# Patient Record
Sex: FEMALE | Race: WHITE | NOT HISPANIC OR LATINO | Employment: OTHER | URBAN - METROPOLITAN AREA
[De-identification: names, ages, dates, MRNs, and addresses within clinical notes are randomized per-mention and may not be internally consistent; named-entity substitution may affect disease eponyms.]

---

## 2023-10-24 ENCOUNTER — OFFICE VISIT (OUTPATIENT)
Dept: ORTHOPEDIC SURGERY | Facility: CLINIC | Age: 78
End: 2023-10-24
Payer: MEDICARE

## 2023-10-24 VITALS — BODY MASS INDEX: 48.82 KG/M2 | WEIGHT: 293 LBS | HEIGHT: 65 IN

## 2023-10-24 DIAGNOSIS — M21.41 ACQUIRED FLAT FOOT, RIGHT: ICD-10-CM

## 2023-10-24 DIAGNOSIS — Z96.653 STATUS POST BILATERAL KNEE REPLACEMENTS: Primary | ICD-10-CM

## 2023-10-24 DIAGNOSIS — Z96.643 STATUS POST BILATERAL TOTAL HIP REPLACEMENT: ICD-10-CM

## 2023-10-24 PROCEDURE — 1159F MED LIST DOCD IN RCRD: CPT | Performed by: ORTHOPAEDIC SURGERY

## 2023-10-24 PROCEDURE — 1126F AMNT PAIN NOTED NONE PRSNT: CPT | Performed by: ORTHOPAEDIC SURGERY

## 2023-10-24 PROCEDURE — 99213 OFFICE O/P EST LOW 20 MIN: CPT | Performed by: ORTHOPAEDIC SURGERY

## 2023-10-24 PROCEDURE — 1160F RVW MEDS BY RX/DR IN RCRD: CPT | Performed by: ORTHOPAEDIC SURGERY

## 2023-10-24 ASSESSMENT — PAIN - FUNCTIONAL ASSESSMENT: PAIN_FUNCTIONAL_ASSESSMENT: NO/DENIES PAIN

## 2023-10-24 ASSESSMENT — PAIN SCALES - GENERAL: PAINLEVEL_OUTOF10: 0 - NO PAIN

## 2023-10-24 NOTE — PROGRESS NOTES
Subjective    Patient ID: Reema Avila is a 78 y.o. female.    Chief Complaint: Follow-up of the Left Hip (S/P EDNA), Follow-up of the Left Knee (S/P TKA), Follow-up of the Right Hip (S/P EDNA), and Follow-up of the Right Knee (S/P TKA)     Last Surgery: No surgery found  Last Surgery Date: No surgery found    HPI both hips and both knees are doing terrific    Objective   Ortho Exam full range of motion hips without pain knees show range of motion 0-1 10 soft tissue stop excellent stability no pain no effusion right foot shows a collapsed arch    Image Results:      Assessment/Plan she is doing terrific at the hips and knees close we will see her again in about a year she is getting some conflicting information about the right foot so I asked her to go see Dr. Castillo  Encounter Diagnoses:  Status post bilateral knee replacements    Status post bilateral total hip replacement    Acquired flat foot, right    No orders of the defined types were placed in this encounter.    No follow-ups on file.

## 2023-11-09 ENCOUNTER — TELEPHONE (OUTPATIENT)
Dept: OBSTETRICS AND GYNECOLOGY | Facility: CLINIC | Age: 78
End: 2023-11-09
Payer: MEDICARE

## 2023-11-09 DIAGNOSIS — Z12.31 VISIT FOR SCREENING MAMMOGRAM: Primary | ICD-10-CM

## 2023-11-10 ENCOUNTER — TELEPHONE (OUTPATIENT)
Dept: OBSTETRICS AND GYNECOLOGY | Facility: HOSPITAL | Age: 78
End: 2023-11-10
Payer: MEDICARE

## 2023-11-10 DIAGNOSIS — Z12.31 VISIT FOR SCREENING MAMMOGRAM: Primary | ICD-10-CM

## 2023-12-12 ENCOUNTER — HOSPITAL ENCOUNTER (OUTPATIENT)
Dept: RADIOLOGY | Facility: HOSPITAL | Age: 78
Discharge: HOME | End: 2023-12-12
Payer: MEDICARE

## 2023-12-12 ENCOUNTER — APPOINTMENT (OUTPATIENT)
Dept: OBSTETRICS AND GYNECOLOGY | Facility: CLINIC | Age: 78
End: 2023-12-12
Payer: MEDICARE

## 2023-12-12 VITALS — HEIGHT: 65 IN | WEIGHT: 293 LBS | BODY MASS INDEX: 48.82 KG/M2

## 2023-12-12 DIAGNOSIS — Z12.31 VISIT FOR SCREENING MAMMOGRAM: ICD-10-CM

## 2023-12-12 PROCEDURE — 77063 BREAST TOMOSYNTHESIS BI: CPT

## 2023-12-13 DIAGNOSIS — R92.8 ABNORMAL MAMMOGRAM: Primary | ICD-10-CM

## 2023-12-14 ENCOUNTER — APPOINTMENT (OUTPATIENT)
Dept: RADIOLOGY | Facility: HOSPITAL | Age: 78
End: 2023-12-14
Payer: MEDICARE

## 2023-12-19 ENCOUNTER — APPOINTMENT (OUTPATIENT)
Dept: RADIOLOGY | Facility: HOSPITAL | Age: 78
End: 2023-12-19
Payer: MEDICARE

## 2024-01-04 ENCOUNTER — HOSPITAL ENCOUNTER (OUTPATIENT)
Dept: RADIOLOGY | Facility: HOSPITAL | Age: 79
Discharge: HOME | End: 2024-01-04
Payer: MEDICARE

## 2024-01-04 DIAGNOSIS — R92.8 ABNORMAL MAMMOGRAM: ICD-10-CM

## 2024-01-04 DIAGNOSIS — R92.8 OTHER ABNORMAL AND INCONCLUSIVE FINDINGS ON DIAGNOSTIC IMAGING OF BREAST: ICD-10-CM

## 2024-01-04 PROCEDURE — 76642 ULTRASOUND BREAST LIMITED: CPT | Mod: LT

## 2024-01-04 PROCEDURE — 77061 BREAST TOMOSYNTHESIS UNI: CPT | Mod: LT

## 2024-01-04 PROCEDURE — 76982 USE 1ST TARGET LESION: CPT | Mod: LT

## 2024-01-30 ENCOUNTER — OFFICE VISIT (OUTPATIENT)
Dept: OBSTETRICS AND GYNECOLOGY | Facility: CLINIC | Age: 79
End: 2024-01-30
Payer: MEDICARE

## 2024-01-30 VITALS
WEIGHT: 293 LBS | BODY MASS INDEX: 50.02 KG/M2 | HEIGHT: 64 IN | SYSTOLIC BLOOD PRESSURE: 128 MMHG | DIASTOLIC BLOOD PRESSURE: 68 MMHG

## 2024-01-30 DIAGNOSIS — Z01.419 VISIT FOR PELVIC EXAM: Primary | ICD-10-CM

## 2024-01-30 DIAGNOSIS — R92.8 ABNORMAL MAMMOGRAM: ICD-10-CM

## 2024-01-30 DIAGNOSIS — Z12.31 VISIT FOR SCREENING MAMMOGRAM: ICD-10-CM

## 2024-01-30 PROCEDURE — G0101 CA SCREEN;PELVIC/BREAST EXAM: HCPCS | Performed by: OBSTETRICS & GYNECOLOGY

## 2024-01-30 PROCEDURE — 1036F TOBACCO NON-USER: CPT | Performed by: OBSTETRICS & GYNECOLOGY

## 2024-01-30 PROCEDURE — 1126F AMNT PAIN NOTED NONE PRSNT: CPT | Performed by: OBSTETRICS & GYNECOLOGY

## 2024-01-30 PROCEDURE — 1159F MED LIST DOCD IN RCRD: CPT | Performed by: OBSTETRICS & GYNECOLOGY

## 2024-01-30 RX ORDER — LISINOPRIL AND HYDROCHLOROTHIAZIDE 20; 25 MG/1; MG/1
TABLET ORAL
COMMUNITY
Start: 2023-02-28

## 2024-01-30 RX ORDER — MULTIVITAMIN
1 TABLET ORAL
COMMUNITY
Start: 2012-09-06

## 2024-01-30 RX ORDER — DULOXETIN HYDROCHLORIDE 60 MG/1
60 CAPSULE, DELAYED RELEASE ORAL
COMMUNITY
Start: 2013-07-29 | End: 2024-03-31

## 2024-01-30 RX ORDER — METOPROLOL TARTRATE 100 MG/1
100 TABLET ORAL 2 TIMES DAILY
COMMUNITY
Start: 2012-09-06

## 2024-01-30 RX ORDER — LEVOTHYROXINE SODIUM 112 UG/1
112 TABLET ORAL
COMMUNITY
Start: 2023-09-25

## 2024-01-30 RX ORDER — PROPRANOLOL/HYDROCHLOROTHIAZID 40 MG-25MG
TABLET ORAL
COMMUNITY

## 2024-01-30 RX ORDER — ASCORBIC ACID 500 MG
TABLET ORAL
COMMUNITY

## 2024-01-30 RX ORDER — AMLODIPINE BESYLATE 10 MG/1
10 TABLET ORAL
COMMUNITY
Start: 2023-09-25

## 2024-01-30 RX ORDER — LOVASTATIN 40 MG/1
40 TABLET ORAL NIGHTLY
COMMUNITY
Start: 2012-09-04

## 2024-01-30 ASSESSMENT — ENCOUNTER SYMPTOMS
GASTROINTESTINAL NEGATIVE: 1
PSYCHIATRIC NEGATIVE: 1
CARDIOVASCULAR NEGATIVE: 1
RESPIRATORY NEGATIVE: 1
MUSCULOSKELETAL NEGATIVE: 1
CONSTITUTIONAL NEGATIVE: 1
NEUROLOGICAL NEGATIVE: 1

## 2024-01-30 NOTE — PROGRESS NOTES
"Subjective   Reema Avila is a 78 y.o. female who presents for annual exam. The patient has no complaints today. The patient is not sexually active. GYN screening history: last pap: was normal. The patient is not taking hormone replacement therapy. Patient denies post-menopausal vaginal bleeding.. The patient wears seatbelts: yes. The patient participates in regular exercise: no. Has the patient ever been transfused or tattooed?: no. The patient reports that there is not domestic violence in their life.     Menstrual History:  OB History          5    Para   3    Term   3            AB   2    Living   3         SAB   2    IAB        Ectopic        Multiple        Live Births   3                  No LMP recorded. Patient is postmenopausal.         Review of Systems   Constitutional: Negative.    Respiratory: Negative.     Cardiovascular: Negative.    Gastrointestinal: Negative.    Genitourinary: Negative.    Musculoskeletal: Negative.    Neurological: Negative.    Psychiatric/Behavioral: Negative.          Objective   /68   Ht 1.626 m (5' 4\")   Wt 136 kg (300 lb)   BMI 51.49 kg/m²     General:   alert and oriented, in no acute distress   Heart: regular rate and rhythm, S1, S2 normal, no murmur, click, rub or gallop   Lungs: clear to auscultation bilaterally   Abdomen: soft, non-tender, without masses or organomegaly   Pelvic: Vulva normal in appearance without discoloration, rashes, lesions. Vagina is negative for blood, discharge, lesions. Uterus is small, mobile, non tender. There is no cervical motion tenderness, adnexal masses/tenderness.    Breast: No masses, skin changes, discoloration, tenderness, or nipple drainage bilaterally     Lymph: No LAD   Neck: Normal ROM. Thyroid normal size. No masses/tenderness     Psych: Normal mood/affect     Lab Review      Assessment/Plan   Problem List Items Addressed This Visit    None  Visit Diagnoses         Codes    Visit for pelvic exam    " -  Primary Z01.419    Breast and pelvic exam normal  Precautions given  RTO 2 years     Visit for screening mammogram     Z12.31    Order given 1/30/24

## 2024-01-30 NOTE — PATIENT INSTRUCTIONS
Routine Gynecologic Visit:  You were seen today for a routine gyn visit with normal findings on exam  Your pap smear had normal cells and was negative for HPV prior to age 65, so you were not due for a pap smear today. You should still continue to get annual breast and pelvic exams in the office.  An order was placed in the system for mammogram. Please get done at your earliest convenience  If you are having any gynecologic issues in the next year you should call the office. [phone] ([place]) or [phone] ([place])

## 2024-02-27 ENCOUNTER — OFFICE VISIT (OUTPATIENT)
Dept: CARDIOLOGY | Facility: CLINIC | Age: 79
End: 2024-02-27
Payer: MEDICARE

## 2024-02-27 VITALS
BODY MASS INDEX: 50.02 KG/M2 | RESPIRATION RATE: 16 BRPM | WEIGHT: 293 LBS | OXYGEN SATURATION: 93 % | DIASTOLIC BLOOD PRESSURE: 91 MMHG | HEIGHT: 64 IN | SYSTOLIC BLOOD PRESSURE: 165 MMHG | HEART RATE: 67 BPM

## 2024-02-27 DIAGNOSIS — I10 ESSENTIAL HYPERTENSION: Primary | ICD-10-CM

## 2024-02-27 DIAGNOSIS — E78.5 DYSLIPIDEMIA: ICD-10-CM

## 2024-02-27 DIAGNOSIS — I35.0 NONRHEUMATIC AORTIC VALVE STENOSIS: ICD-10-CM

## 2024-02-27 PROCEDURE — 99214 OFFICE O/P EST MOD 30 MIN: CPT | Performed by: INTERNAL MEDICINE

## 2024-02-27 PROCEDURE — 3077F SYST BP >= 140 MM HG: CPT | Performed by: INTERNAL MEDICINE

## 2024-02-27 PROCEDURE — 3080F DIAST BP >= 90 MM HG: CPT | Performed by: INTERNAL MEDICINE

## 2024-02-27 PROCEDURE — 93005 ELECTROCARDIOGRAM TRACING: CPT | Performed by: INTERNAL MEDICINE

## 2024-02-27 PROCEDURE — 1159F MED LIST DOCD IN RCRD: CPT | Performed by: INTERNAL MEDICINE

## 2024-02-27 PROCEDURE — 93010 ELECTROCARDIOGRAM REPORT: CPT | Performed by: INTERNAL MEDICINE

## 2024-02-27 RX ORDER — LOSARTAN POTASSIUM AND HYDROCHLOROTHIAZIDE 25; 100 MG/1; MG/1
1 TABLET ORAL DAILY
COMMUNITY
Start: 2024-02-02 | End: 2024-05-02

## 2024-02-27 ASSESSMENT — PATIENT HEALTH QUESTIONNAIRE - PHQ9
1. LITTLE INTEREST OR PLEASURE IN DOING THINGS: NOT AT ALL
2. FEELING DOWN, DEPRESSED OR HOPELESS: NOT AT ALL
SUM OF ALL RESPONSES TO PHQ9 QUESTIONS 1 AND 2: 0

## 2024-02-27 ASSESSMENT — ENCOUNTER SYMPTOMS: DEPRESSION: 0

## 2024-02-27 NOTE — PROGRESS NOTES
"Chief Complaint:   Follow-up (Annual FUV)     History Of Present Illness:    Reema Avila is a 78 y.o. female presenting for follow-up.  Doing well from a cardiac standpoint at this time--denies any chest pain, shortness of breath, dizziness/syncope.  Compliant medications.     Last Recorded Vitals:  Vitals:    02/27/24 0853   BP: (!) 165/91   BP Location: Left arm   Patient Position: Sitting   BP Cuff Size: Large adult   Pulse: 67   Resp: 16   SpO2: 93%   Weight: 135 kg (298 lb)   Height: 1.626 m (5' 4\")       Past Medical History:  She has a past medical history of Encounter for full-term uncomplicated delivery, Encounter for gynecological examination (general) (routine) without abnormal findings, Personal history of other diseases of the circulatory system, Personal history of other diseases of the musculoskeletal system and connective tissue (06/13/2018), Personal history of other endocrine, nutritional and metabolic disease, and Varicella without complication.    Past Surgical History:  She has a past surgical history that includes Colonoscopy (05/06/2013); Colonoscopy (04/21/2016); Other surgical history (10/31/2018); Other surgical history (10/31/2018); Dilation and curettage of uterus (10/31/2018); Other surgical history (03/24/2021); Total knee arthroplasty (10/31/2018); Total knee arthroplasty (10/31/2018); BI stereotactic guided breast left localization and biopsy (Left, 03/30/2014); and Breast biopsy (Left, 03/30/2014).      Social History:  She reports that she has never smoked. She has never used smokeless tobacco. She reports that she does not currently use alcohol. She reports that she does not use drugs.    Family History:  Family History   Problem Relation Name Age of Onset    Ovarian cancer Maternal Grandmother  46        Allergies:  Epinephrine    Outpatient Medications:  Current Outpatient Medications   Medication Instructions    amLODIPine (NORVASC) 10 mg, oral, Daily RT    ascorbic " acid (Vitamin C) 500 mg tablet oral    CALCIUM CITRATE-VITAMIN D3 ORAL oral    DOCOSAHEXAENOIC ACID ORAL 2 tabs daily    DULoxetine (CYMBALTA) 60 mg, oral    glucosamine HCl-msm-chondroitn (TripleFlex) 750-375-400 mg tablet oral    levothyroxine (SYNTHROID, LEVOXYL) 112 mcg, oral    lisinopriL-hydrochlorothiazide 20-25 mg tablet oral    losartan-hydrochlorothiazide (Hyzaar) 100-25 mg tablet 1 tablet, oral, Daily    lovastatin (MEVACOR) 40 mg, oral, Nightly    metoprolol tartrate (LOPRESSOR) 100 mg, oral, 2 times daily    multivitamin tablet 1 tablet, oral, Daily RT    OMEGA-3 ACID ETHYL ESTERS ORAL oral    turmeric-turmeric root extract 450-50 mg capsule oral    ubidecarenone (COENZYME Q10, BULK, MISC) oral       Physical Exam:  Constitutional:       Appearance: Healthy appearance. Not in distress.   Neck:      Vascular: No JVR. JVD normal.   Pulmonary:      Effort: Pulmonary effort is normal.      Breath sounds: Normal breath sounds. No wheezing. No rhonchi. No rales.   Chest:      Chest wall: Not tender to palpatation.   Cardiovascular:      Normal rate. Regular rhythm.      Murmurs: There is a grade 4/6 systolic murmur.   Edema:     Peripheral edema absent.   Abdominal:      General: Bowel sounds are normal.      Palpations: Abdomen is soft.      Tenderness: There is no abdominal tenderness.   Musculoskeletal: Normal range of motion. Skin:     General: Skin is warm and dry.   Neurological:      General: No focal deficit present.      Mental Status: Alert and oriented to person, place and time.           Last Labs:  CBC -  Lab Results   Component Value Date    WBC 10.8 02/05/2021    HGB 16.0 02/05/2021    HCT 49.9 (H) 02/05/2021    MCV 89 02/05/2021     02/05/2021       CMP -  Lab Results   Component Value Date    CALCIUM 9.6 02/05/2021    PROT 8.0 01/22/2021    ALBUMIN 4.7 01/22/2021    ALBUMIN 4.5 09/30/2020    AST 19 01/22/2021    ALT 16 01/22/2021    ALKPHOS 94 01/22/2021    BILITOT 0.5 01/22/2021        LIPID PANEL -   Lab Results   Component Value Date    CHOL 190 01/22/2021    TRIG 204 (H) 01/22/2021    HDL 58.8 01/22/2021    CHHDL 3.2 01/22/2021    LDLF 90 01/22/2021    VLDL 41 (H) 01/22/2021    NHDL 131 01/22/2021       RENAL FUNCTION PANEL -   Lab Results   Component Value Date    GLUCOSE 111 (H) 02/05/2021     02/05/2021    K 3.7 02/05/2021    CL 99 02/05/2021    CO2 30 02/05/2021    ANIONGAP 16 02/05/2021    BUN 18 02/05/2021    CREATININE 0.77 02/05/2021    CALCIUM 9.6 02/05/2021    ALBUMIN 4.7 01/22/2021    ALBUMIN 4.5 09/30/2020        Lab Results   Component Value Date    HGBA1C 5.1 12/12/2019       Last Cardiology Tests:  ECG independently reviewed from today: Sinus rhythm, rate 66, first-degree AV block, right bundle branch block    2/28/23 echo  CONCLUSIONS:  1. Left ventricular systolic function is normal with a 55-60% estimated ejection fraction.  2. Spectral Doppler shows a pseudonormal pattern of left ventricular diastolic filling.  3. The left atrium is moderately dilated.  4. The right atrium is moderately dilated.  5. Moderate aortic valve stenosis.  6. Mild aortic valve regurgitation.  7. There is mild pulmonic and tricuspid regurgitation.        1/5/2021 Echo  CONCLUSIONS:  1. The left ventricular systolic function is normal with a 55-60% estimated ejection fraction.  2. Moderate aortic valve stenosis.  3. There is mild mitral and tricuspid regurgitation.        Echo 12/13/2019:  1. The left ventricular systolic function is mildly decreased with a 45-50%  estimated ejection fraction.  2. Spectral Doppler shows an impaired relaxation pattern of left ventricular  diastolic filling.  3. Mild aortic valve stenosis.  4. The aortic valve appears tricuspid with restriction.  5. The estimated PASP is 33 mmHg.  6. The peak instantaneous gradient of the aortic valve is 28.4 mmHg.  7. There is plaque visualized in the ascending aorta.    Assessment/Plan   Very pleasant 78 year-old female  with HTN, dyslipidemia with moderate AS.  Doing well from a cardiac standpoint at this time, repeat blood pressure 134/77.    Plan:  -Continue current amlodipine, lisinopril-HCTZ, lovastatin, and metoprolol   -Follow-up in 1 year with repeat echocardiogram at that time for surveillance of moderate aortic stenosis: She knows to call in the interim if she develops any progressive chest pain, shortness of breath, or syncope      Ish Isaac MD

## 2024-03-10 LAB
ATRIAL RATE: 66 BPM
P AXIS: 81 DEGREES
P OFFSET: 158 MS
P ONSET: 104 MS
PR INTERVAL: 220 MS
Q ONSET: 214 MS
QRS COUNT: 11 BEATS
QRS DURATION: 164 MS
QT INTERVAL: 490 MS
QTC CALCULATION(BAZETT): 513 MS
QTC FREDERICIA: 506 MS
R AXIS: -89 DEGREES
T AXIS: -26 DEGREES
T OFFSET: 459 MS
VENTRICULAR RATE: 66 BPM

## 2024-05-08 ENCOUNTER — TELEPHONE (OUTPATIENT)
Dept: OBSTETRICS AND GYNECOLOGY | Facility: CLINIC | Age: 79
End: 2024-05-08
Payer: MEDICARE

## 2024-07-29 PROBLEM — I35.0 NONRHEUMATIC AORTIC VALVE STENOSIS: Status: ACTIVE | Noted: 2024-07-29

## 2024-08-07 ENCOUNTER — HOSPITAL ENCOUNTER (OUTPATIENT)
Dept: RADIOLOGY | Facility: HOSPITAL | Age: 79
Discharge: HOME | End: 2024-08-07
Payer: MEDICARE

## 2024-08-07 VITALS — BODY MASS INDEX: 42.51 KG/M2 | WEIGHT: 249 LBS | HEIGHT: 64 IN

## 2024-08-07 DIAGNOSIS — R92.8 ABNORMAL MAMMOGRAM: ICD-10-CM

## 2024-08-07 PROCEDURE — 77065 DX MAMMO INCL CAD UNI: CPT | Mod: LEFT SIDE | Performed by: RADIOLOGY

## 2024-08-07 PROCEDURE — G0279 TOMOSYNTHESIS, MAMMO: HCPCS | Mod: LEFT SIDE | Performed by: RADIOLOGY

## 2024-08-07 PROCEDURE — 77061 BREAST TOMOSYNTHESIS UNI: CPT | Mod: LT

## 2025-01-13 ENCOUNTER — APPOINTMENT (OUTPATIENT)
Dept: RADIOLOGY | Facility: HOSPITAL | Age: 80
End: 2025-01-13
Payer: MEDICARE

## 2025-01-27 ENCOUNTER — HOSPITAL ENCOUNTER (OUTPATIENT)
Dept: RADIOLOGY | Facility: HOSPITAL | Age: 80
Discharge: HOME | End: 2025-01-27
Payer: MEDICARE

## 2025-01-27 VITALS — HEIGHT: 64 IN | WEIGHT: 293 LBS | BODY MASS INDEX: 50.02 KG/M2

## 2025-01-27 DIAGNOSIS — Z12.31 ENCOUNTER FOR SCREENING MAMMOGRAM FOR MALIGNANT NEOPLASM OF BREAST: ICD-10-CM

## 2025-01-27 PROCEDURE — 77063 BREAST TOMOSYNTHESIS BI: CPT | Performed by: RADIOLOGY

## 2025-01-27 PROCEDURE — 77067 SCR MAMMO BI INCL CAD: CPT | Performed by: RADIOLOGY

## 2025-01-27 PROCEDURE — 77067 SCR MAMMO BI INCL CAD: CPT

## 2025-02-25 ENCOUNTER — APPOINTMENT (OUTPATIENT)
Dept: CARDIOLOGY | Facility: CLINIC | Age: 80
End: 2025-02-25
Payer: MEDICARE

## 2025-07-01 ENCOUNTER — APPOINTMENT (OUTPATIENT)
Dept: CARDIOLOGY | Facility: CLINIC | Age: 80
End: 2025-07-01
Payer: MEDICARE